# Patient Record
Sex: MALE | Race: WHITE | NOT HISPANIC OR LATINO | URBAN - METROPOLITAN AREA
[De-identification: names, ages, dates, MRNs, and addresses within clinical notes are randomized per-mention and may not be internally consistent; named-entity substitution may affect disease eponyms.]

---

## 2018-11-16 ENCOUNTER — OFFICE VISIT (OUTPATIENT)
Dept: URBAN - METROPOLITAN AREA CLINIC 16 | Facility: CLINIC | Age: 68
End: 2018-11-16
Payer: MEDICARE

## 2018-11-16 DIAGNOSIS — H25.811 COMBINED FORMS OF AGE-RELATED CATARACT, RIGHT EYE: Primary | ICD-10-CM

## 2018-11-16 PROCEDURE — 99214 OFFICE O/P EST MOD 30 MIN: CPT | Performed by: OPHTHALMOLOGY

## 2018-11-16 RX ORDER — DUREZOL 0.5 MG/ML
0.05 % EMULSION OPHTHALMIC
Qty: 5 | Refills: 1 | Status: INACTIVE
Start: 2018-11-16 | End: 2018-12-13

## 2018-11-16 RX ORDER — PREDNISOLONE ACETATE 10 MG/ML
1 % SUSPENSION/ DROPS OPHTHALMIC
Qty: 5 | Refills: 11 | Status: INACTIVE
Start: 2018-11-16 | End: 2019-11-19

## 2018-11-16 RX ORDER — BESIFLOXACIN 6 MG/ML
0.6 % SUSPENSION OPHTHALMIC
Qty: 5 | Refills: 1 | Status: INACTIVE
Start: 2018-11-16 | End: 2018-11-24

## 2018-11-16 ASSESSMENT — INTRAOCULAR PRESSURE
OD: 11
OS: 8

## 2018-11-16 ASSESSMENT — VISUAL ACUITY: OD: 20/60

## 2018-12-19 ENCOUNTER — TESTING ONLY (OUTPATIENT)
Dept: URBAN - METROPOLITAN AREA CLINIC 16 | Facility: CLINIC | Age: 68
End: 2018-12-19
Payer: COMMERCIAL

## 2018-12-19 PROCEDURE — 76519 ECHO EXAM OF EYE: CPT | Performed by: OPHTHALMOLOGY

## 2018-12-19 RX ORDER — OFLOXACIN 3 MG/ML
0.3 % SOLUTION/ DROPS OPHTHALMIC
Qty: 5 | Refills: 1 | Status: INACTIVE
Start: 2019-01-13 | End: 2019-03-04

## 2018-12-19 RX ORDER — PREDNISOLONE ACETATE 10 MG/ML
1 % SUSPENSION/ DROPS OPHTHALMIC
Qty: 5 | Refills: 1 | Status: INACTIVE
Start: 2019-01-15 | End: 2019-02-11

## 2018-12-19 ASSESSMENT — PACHYMETRY
OD: 29.09
OD: 3.52

## 2019-02-25 ENCOUNTER — SURGERY (OUTPATIENT)
Dept: URBAN - METROPOLITAN AREA SURGERY 11 | Facility: SURGERY | Age: 69
End: 2019-02-25
Payer: COMMERCIAL

## 2019-02-25 PROCEDURE — 66984 XCAPSL CTRC RMVL W/O ECP: CPT | Performed by: OPHTHALMOLOGY

## 2019-02-26 ENCOUNTER — POST-OPERATIVE VISIT (OUTPATIENT)
Dept: URBAN - METROPOLITAN AREA CLINIC 16 | Facility: CLINIC | Age: 69
End: 2019-02-26

## 2019-02-26 DIAGNOSIS — Z09 ENCNTR FOR F/U EXAM AFT TRTMT FOR COND OTH THAN MALIG NEOPLM: Primary | ICD-10-CM

## 2019-02-26 PROCEDURE — 99024 POSTOP FOLLOW-UP VISIT: CPT | Performed by: OPTOMETRIST

## 2019-02-26 ASSESSMENT — INTRAOCULAR PRESSURE
OS: 12
OD: 12

## 2019-03-04 ENCOUNTER — POST-OPERATIVE VISIT (OUTPATIENT)
Dept: URBAN - METROPOLITAN AREA CLINIC 16 | Facility: CLINIC | Age: 69
End: 2019-03-04

## 2019-03-04 PROCEDURE — 99024 POSTOP FOLLOW-UP VISIT: CPT | Performed by: OPTOMETRIST

## 2019-03-04 ASSESSMENT — INTRAOCULAR PRESSURE
OD: 14
OS: 8

## 2019-03-26 ENCOUNTER — POST-OPERATIVE VISIT (OUTPATIENT)
Dept: URBAN - METROPOLITAN AREA CLINIC 16 | Facility: CLINIC | Age: 69
End: 2019-03-26

## 2019-03-26 PROCEDURE — 99024 POSTOP FOLLOW-UP VISIT: CPT | Performed by: OPTOMETRIST

## 2019-03-26 ASSESSMENT — INTRAOCULAR PRESSURE
OS: 8
OD: 15

## 2019-03-26 ASSESSMENT — VISUAL ACUITY: OD: 20/30

## 2019-06-26 ENCOUNTER — OFFICE VISIT (OUTPATIENT)
Dept: URBAN - METROPOLITAN AREA CLINIC 16 | Facility: CLINIC | Age: 69
End: 2019-06-26
Payer: COMMERCIAL

## 2019-06-26 DIAGNOSIS — Z96.1 PRESENCE OF PSEUDOPHAKIA: Primary | ICD-10-CM

## 2019-06-26 DIAGNOSIS — H18.12 BULLOUS KERATOPATHY, LEFT EYE: ICD-10-CM

## 2019-06-26 PROCEDURE — 92014 COMPRE OPH EXAM EST PT 1/>: CPT | Performed by: OPTOMETRIST

## 2019-06-26 PROCEDURE — 92004 COMPRE OPH EXAM NEW PT 1/>: CPT | Performed by: OPTOMETRIST

## 2019-06-26 ASSESSMENT — INTRAOCULAR PRESSURE
OS: 7
OD: 14

## 2019-06-26 NOTE — IMPRESSION/PLAN
Impression: Presence of pseudophakia: Z96.1 OD. Plan: Discussed diagnosis. Will continue to observe.

## 2019-06-26 NOTE — IMPRESSION/PLAN
Impression: Bullous keratopathy, left eye: H18.12. Plan: Discussed diagnosis in detail with patient. Continue using current medication(s).

## 2020-10-12 ENCOUNTER — OFFICE VISIT (OUTPATIENT)
Dept: URBAN - METROPOLITAN AREA CLINIC 16 | Facility: CLINIC | Age: 70
End: 2020-10-12
Payer: COMMERCIAL

## 2020-10-12 PROCEDURE — 92014 COMPRE OPH EXAM EST PT 1/>: CPT | Performed by: OPTOMETRIST

## 2020-10-12 RX ORDER — PREDNISOLONE ACETATE 10 MG/ML
1 % SUSPENSION/ DROPS OPHTHALMIC
Qty: 5 | Refills: 11 | Status: INACTIVE
Start: 2020-10-12 | End: 2021-12-28

## 2020-10-12 ASSESSMENT — KERATOMETRY
OS: 50.13
OD: 41.25

## 2020-10-12 ASSESSMENT — INTRAOCULAR PRESSURE
OD: 15
OS: 7

## 2020-10-12 NOTE — IMPRESSION/PLAN
Impression: Other secondary cataract, right eye: H26.491. Plan: Discussed diagnosis in detail with patient.

## 2020-10-12 NOTE — IMPRESSION/PLAN
Impression: Bullous keratopathy, left eye: H18.12. Plan: Discussed diagnosis in detail with patient. Will continue to observe condition and or symptoms. Continue using current medication(s).

## 2020-10-23 ENCOUNTER — OFFICE VISIT (OUTPATIENT)
Dept: URBAN - METROPOLITAN AREA CLINIC 16 | Facility: CLINIC | Age: 70
End: 2020-10-23
Payer: COMMERCIAL

## 2020-10-23 DIAGNOSIS — H26.491 OTHER SECONDARY CATARACT, RIGHT EYE: Primary | ICD-10-CM

## 2020-10-23 PROCEDURE — 99214 OFFICE O/P EST MOD 30 MIN: CPT | Performed by: OPHTHALMOLOGY

## 2020-10-23 ASSESSMENT — INTRAOCULAR PRESSURE
OS: 11
OD: 11

## 2020-10-23 ASSESSMENT — VISUAL ACUITY
OD: 20/50
OS: HM

## 2020-10-23 ASSESSMENT — KERATOMETRY: OD: 41.25

## 2020-10-23 NOTE — IMPRESSION/PLAN
Impression: Bullous keratopathy, left eye: H18.12. failed PK OS with NV Plan: High risk for repeat PK

## 2020-11-23 ENCOUNTER — SURGERY (OUTPATIENT)
Dept: URBAN - METROPOLITAN AREA SURGERY 11 | Facility: SURGERY | Age: 70
End: 2020-11-23
Payer: COMMERCIAL

## 2020-11-23 PROCEDURE — 66821 AFTER CATARACT LASER SURGERY: CPT | Performed by: OPHTHALMOLOGY

## 2020-11-30 ENCOUNTER — POST-OPERATIVE VISIT (OUTPATIENT)
Dept: URBAN - METROPOLITAN AREA CLINIC 16 | Facility: CLINIC | Age: 70
End: 2020-11-30
Payer: COMMERCIAL

## 2020-11-30 DIAGNOSIS — Z48.810 ENCOUNTER FOR SURGICAL AFTERCARE FOLLOWING SURGERY ON A SENSE ORGAN: Primary | ICD-10-CM

## 2020-11-30 PROCEDURE — 99024 POSTOP FOLLOW-UP VISIT: CPT | Performed by: OPTOMETRIST

## 2020-11-30 ASSESSMENT — INTRAOCULAR PRESSURE
OD: 12
OS: 12

## 2020-11-30 NOTE — IMPRESSION/PLAN
Impression: S/P YAG PC OD - 7 Days. Encounter for surgical aftercare following surgery on a sense organ  Z48.810. Plan: --Advised patient to use artificial tears for comfort.

## 2021-12-27 ENCOUNTER — OFFICE VISIT (OUTPATIENT)
Dept: URBAN - METROPOLITAN AREA CLINIC 16 | Facility: CLINIC | Age: 71
End: 2021-12-27
Payer: COMMERCIAL

## 2021-12-27 DIAGNOSIS — H33.8 OTHER RETINAL DETACHMENTS: ICD-10-CM

## 2021-12-27 PROCEDURE — 99213 OFFICE O/P EST LOW 20 MIN: CPT | Performed by: OPTOMETRIST

## 2021-12-27 ASSESSMENT — INTRAOCULAR PRESSURE
OS: 12
OD: 12

## 2021-12-27 NOTE — IMPRESSION/PLAN
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 Will continue to observe.

## 2022-03-14 NOTE — IMPRESSION/PLAN
Impression: Bullous keratopathy, left eye: H18.12. with failed PK Plan: Discussed diagnosis in detail with patient. Continue using current medication(s). Medication refill given today.

## 2022-11-11 ENCOUNTER — OFFICE VISIT (OUTPATIENT)
Dept: URBAN - METROPOLITAN AREA CLINIC 24 | Facility: CLINIC | Age: 72
End: 2022-11-11
Payer: COMMERCIAL

## 2022-11-11 DIAGNOSIS — T86.8412 CORNEAL TRANSPLANT FAILURE, LEFT EYE: Primary | ICD-10-CM

## 2022-11-11 DIAGNOSIS — H33.8 OTHER RETINAL DETACHMENTS: ICD-10-CM

## 2022-11-11 DIAGNOSIS — Z96.1 PRESENCE OF PSEUDOPHAKIA: ICD-10-CM

## 2022-11-11 PROCEDURE — 76514 ECHO EXAM OF EYE THICKNESS: CPT | Performed by: OPHTHALMOLOGY

## 2022-11-11 PROCEDURE — 92025 CPTRIZED CORNEAL TOPOGRAPHY: CPT | Performed by: OPHTHALMOLOGY

## 2022-11-11 PROCEDURE — 99204 OFFICE O/P NEW MOD 45 MIN: CPT | Performed by: OPHTHALMOLOGY

## 2022-11-11 ASSESSMENT — INTRAOCULAR PRESSURE
OS: 8
OD: 14

## 2022-11-11 NOTE — IMPRESSION/PLAN
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 Will continue to observe. 
Retina clearance before PKP

## 2022-11-11 NOTE — IMPRESSION/PLAN
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 Discussed repeat PKP surgery. Visual rehabilitation can take a year or longer. Risks of Keratoplasty include similar risks to any intraocular surgery such as bleeding, cataract, and infection. Risks include rejection, need for additional surgery, need for glasses after, and the risks from the medications used. Steroids should not be stopped without instruction by a doctor. Information packet given. Questions answered. Explained to pt that he has extensive retinal scarring, visual potential is guarded and unknown. No guarantee that his vision would improve. Also risk of worsening vision and blindness. Pt understands and wishes to proceed with surgery. Schedule PKP OS Retina clearance before PKP

## 2023-04-24 ENCOUNTER — ADULT PHYSICAL (OUTPATIENT)
Dept: URBAN - METROPOLITAN AREA CLINIC 24 | Facility: CLINIC | Age: 73
End: 2023-04-24
Payer: COMMERCIAL

## 2023-04-24 DIAGNOSIS — Z01.818 ENCOUNTER FOR OTHER PREPROCEDURAL EXAMINATION: Primary | ICD-10-CM

## 2023-04-24 DIAGNOSIS — T86.8412 CORNEAL TRANSPLANT FAILURE, LEFT EYE: ICD-10-CM

## 2023-04-24 PROCEDURE — 99203 OFFICE O/P NEW LOW 30 MIN: CPT | Performed by: REGISTERED NURSE

## 2023-04-24 RX ORDER — PREDNISOLONE ACETATE 10 MG/ML
1 % SUSPENSION/ DROPS OPHTHALMIC
Qty: 5 | Refills: 3 | Status: ACTIVE
Start: 2023-04-24

## 2023-04-24 RX ORDER — OFLOXACIN 3 MG/ML
0.3 % SOLUTION/ DROPS OPHTHALMIC
Qty: 5 | Refills: 1 | Status: ACTIVE
Start: 2023-04-24

## 2023-04-25 ENCOUNTER — OFFICE VISIT (OUTPATIENT)
Dept: URBAN - METROPOLITAN AREA CLINIC 10 | Facility: CLINIC | Age: 73
End: 2023-04-25
Payer: COMMERCIAL

## 2023-04-25 PROCEDURE — 99214 OFFICE O/P EST MOD 30 MIN: CPT | Performed by: OPHTHALMOLOGY

## 2023-04-25 PROCEDURE — 92134 CPTRZ OPH DX IMG PST SGM RTA: CPT | Performed by: OPHTHALMOLOGY

## 2023-04-25 ASSESSMENT — INTRAOCULAR PRESSURE
OD: 17
OS: 9

## 2023-04-25 NOTE — IMPRESSION/PLAN
Impression: s/p PPVX for repair of Retinal RD OS '05 in [place] w SBP / VIT  H33.012. OS Plan: Retina is stable with Myopic fundus. Prior S. OIL / SBP / VIT multiple. F/u prior Dr. Jackie Sandhu and Dr. Kali Shields. Exam and imaging CONFIRM retina is ATTACHED but w EXTENSIVE RPE chng and scarring / SBP and atrophy c/w MULTIPLE surgy and RD repair--  However, w RETINA attached, OK proceed PKP / Cornea care.      RETINA yearly w [facility] thereafter

## 2023-04-25 NOTE — IMPRESSION/PLAN
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 See other plan. RETINA is attached (but limiting issue).   OK proceed K PKP

## 2023-04-28 ENCOUNTER — PRE-OPERATIVE VISIT (OUTPATIENT)
Dept: URBAN - METROPOLITAN AREA CLINIC 24 | Facility: CLINIC | Age: 73
End: 2023-04-28
Payer: COMMERCIAL

## 2023-04-28 DIAGNOSIS — H33.012 RETINAL DETACHMENT WITH SINGLE BREAK, LEFT EYE: ICD-10-CM

## 2023-04-28 DIAGNOSIS — Z96.1 PRESENCE OF PSEUDOPHAKIA: ICD-10-CM

## 2023-04-28 PROCEDURE — 92025 CPTRIZED CORNEAL TOPOGRAPHY: CPT | Performed by: OPHTHALMOLOGY

## 2023-04-28 PROCEDURE — 99214 OFFICE O/P EST MOD 30 MIN: CPT | Performed by: OPHTHALMOLOGY

## 2023-04-28 ASSESSMENT — KERATOMETRY
OS: 43.55
OD: 41.01

## 2023-04-28 ASSESSMENT — VISUAL ACUITY: OD: 20/30

## 2023-04-28 ASSESSMENT — INTRAOCULAR PRESSURE
OS: 8
OD: 12

## 2023-04-28 NOTE — IMPRESSION/PLAN
Impression: Corneal transplant failure, left eye: W18.5084. 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 Discussed repeat PKP surgery. Visual rehabilitation can take a year or longer. Risks of Keratoplasty include similar risks to any intraocular surgery such as bleeding, cataract, and infection. Risks include rejection, need for additional surgery, need for glasses after, and the risks from the medications used. Steroids should not be stopped without instruction by a doctor. Information packet given. Questions answered. Explained to pt that he has extensive retinal scarring, visual potential is guarded and unknown. No guarantee that his vision would improve. Also risk of worsening vision and blindness. Pt understands and wishes to proceed with PKP OS; cleared by retina.

## 2023-04-28 NOTE — IMPRESSION/PLAN
Impression: s/p PPVX for repair of Retinal RD OS '05 in [place] w SBP / VIT  H33.012. OS Plan: Per Dr. Abraham Harvey: Marli Joyner is stable with Myopic fundus. Prior S. OIL / SBP / VIT multiple. F/u prior Dr. Radha Milan and Dr. Alexander Mccallum. Exam and imaging CONFIRM retina is ATTACHED but w EXTENSIVE RPE chng and scarring / SBP and atrophy c/w MULTIPLE surgy and RD repair--  However, w RETINA attached, OK proceed PKP / Cornea care.      RETINA yearly w [facility] thereafter

## 2023-05-02 ENCOUNTER — SURGERY (OUTPATIENT)
Dept: URBAN - METROPOLITAN AREA SURGERY 5 | Facility: SURGERY | Age: 73
End: 2023-05-02
Payer: COMMERCIAL

## 2023-05-02 PROCEDURE — 65756 CORNEAL TRNSPL ENDOTHELIAL: CPT | Performed by: OPHTHALMOLOGY

## 2023-05-03 ENCOUNTER — POST-OPERATIVE VISIT (OUTPATIENT)
Dept: URBAN - METROPOLITAN AREA CLINIC 24 | Facility: CLINIC | Age: 73
End: 2023-05-03
Payer: COMMERCIAL

## 2023-05-03 DIAGNOSIS — Z48.810 ENCOUNTER FOR SURGICAL AFTERCARE FOLLOWING SURGERY ON A SENSE ORGAN: Primary | ICD-10-CM

## 2023-05-03 PROCEDURE — 99024 POSTOP FOLLOW-UP VISIT: CPT | Performed by: OPTOMETRIST

## 2023-05-03 NOTE — IMPRESSION/PLAN
Impression: S/P Penetrating keratoplasty OS - 1 Day. Encounter for surgical aftercare following surgery on a sense organ  Z48.810. Plan: PO instructions reviewed. Pt to call immediately if any changes in vision or new onset pain.

## 2023-05-11 ENCOUNTER — POST-OPERATIVE VISIT (OUTPATIENT)
Dept: URBAN - METROPOLITAN AREA CLINIC 26 | Facility: CLINIC | Age: 73
End: 2023-05-11
Payer: COMMERCIAL

## 2023-05-11 DIAGNOSIS — Z48.810 ENCOUNTER FOR SURGICAL AFTERCARE FOLLOWING SURGERY ON A SENSE ORGAN: Primary | ICD-10-CM

## 2023-05-11 PROCEDURE — 99024 POSTOP FOLLOW-UP VISIT: CPT | Performed by: OPTOMETRIST

## 2023-05-11 ASSESSMENT — INTRAOCULAR PRESSURE
OS: 12
OD: 18

## 2023-05-11 NOTE — IMPRESSION/PLAN
Impression:  Encounter for surgical aftercare following surgery on a sense organ  Z48.810. Plan: graft intact. pt will continue pred qid OS.  rtc as scheduled with Dr. Wally White or damian

## 2023-05-18 ENCOUNTER — OFFICE VISIT (OUTPATIENT)
Dept: URBAN - METROPOLITAN AREA CLINIC 24 | Facility: CLINIC | Age: 73
End: 2023-05-18
Payer: COMMERCIAL

## 2023-05-18 DIAGNOSIS — Z94.7 CORNEAL TRANSPLANT STATUS: Primary | ICD-10-CM

## 2023-05-18 DIAGNOSIS — H33.012 RETINAL DETACHMENT WITH SINGLE BREAK, LEFT EYE: ICD-10-CM

## 2023-05-18 DIAGNOSIS — H16.142 PUNCTATE KERATITIS, LEFT EYE: ICD-10-CM

## 2023-05-18 PROCEDURE — 68761 CLOSE TEAR DUCT OPENING: CPT | Performed by: OPHTHALMOLOGY

## 2023-05-18 PROCEDURE — 99024 POSTOP FOLLOW-UP VISIT: CPT | Performed by: OPHTHALMOLOGY

## 2023-05-18 RX ORDER — ERYTHROMYCIN 5 MG/G
OINTMENT OPHTHALMIC
Qty: 3.5 | Refills: 3 | Status: ACTIVE
Start: 2023-05-18

## 2023-05-18 ASSESSMENT — INTRAOCULAR PRESSURE
OD: 16
OS: 28

## 2023-05-18 NOTE — IMPRESSION/PLAN
Impression: Corneal transplant status: Z94.7.
- s/p PKP OS 5/2/23 Plan: POW#2, doing well. Edema resolving, sutures intact Cont pred and ATs QID, d/c oflox. Precautions reviewed. RTC IOP check.

## 2023-05-18 NOTE — IMPRESSION/PLAN
Impression: s/p PPVX for repair of Retinal RD OS '05 in [place] w SBP / VIT  H33.012. OS Plan: Per Dr. Anna Hensley: Chris Litter is stable with Myopic fundus. Prior S. OIL / SBP / VIT multiple. F/u prior Dr. Roxana Marley and Dr. Helio Foster.   Exam and imaging CONFIRM retina is ATTACHED but w EXTENSIVE RPE chng and scarring / SBP and atrophy c/w MULTIPLE surgy and RD repair--  However, w RETINA attached

## 2023-05-18 NOTE — IMPRESSION/PLAN
Impression: Punctate keratitis, left eye: H16.142. Plan: Increase PFATs to q2hr w/a. 
Start erythro joanna qhs 
PP placed LLL 0.4 ultraplug

## 2023-06-29 ENCOUNTER — OFFICE VISIT (OUTPATIENT)
Dept: URBAN - METROPOLITAN AREA CLINIC 24 | Facility: CLINIC | Age: 73
End: 2023-06-29
Payer: COMMERCIAL

## 2023-06-29 DIAGNOSIS — H16.142 PUNCTATE KERATITIS, LEFT EYE: ICD-10-CM

## 2023-06-29 DIAGNOSIS — Z94.7 CORNEAL TRANSPLANT STATUS: Primary | ICD-10-CM

## 2023-06-29 PROCEDURE — 99024 POSTOP FOLLOW-UP VISIT: CPT | Performed by: OPHTHALMOLOGY

## 2023-06-29 RX ORDER — PREDNISOLONE ACETATE 10 MG/ML
1 % SUSPENSION/ DROPS OPHTHALMIC
Qty: 5 | Refills: 11 | Status: ACTIVE
Start: 2023-06-29

## 2023-06-29 ASSESSMENT — INTRAOCULAR PRESSURE
OD: 12
OS: 4

## 2023-06-29 NOTE — IMPRESSION/PLAN
Impression: Punctate keratitis, left eye: H16.142. PP placed LLL 0.4 ultraplug Plan: Increase PFATs to q2hr w/a. 
Increase erythro to BID

## 2023-06-29 NOTE — IMPRESSION/PLAN
Impression: Corneal transplant status: Z94.7.
- s/p PKP OS 5/2/23 for PKP failure. Plan: POW#6, KNV extending into graft inferiorly Increase pred to 6x/d. Precautions reviewed. RTC IOP check.

## 2023-07-20 ENCOUNTER — OFFICE VISIT (OUTPATIENT)
Dept: URBAN - METROPOLITAN AREA CLINIC 10 | Facility: CLINIC | Age: 73
End: 2023-07-20
Payer: COMMERCIAL

## 2023-07-20 DIAGNOSIS — H16.142 PUNCTATE KERATITIS, LEFT EYE: ICD-10-CM

## 2023-07-20 DIAGNOSIS — Z94.7 CORNEAL TRANSPLANT STATUS: Primary | ICD-10-CM

## 2023-07-20 PROCEDURE — 99024 POSTOP FOLLOW-UP VISIT: CPT | Performed by: OPHTHALMOLOGY

## 2023-07-20 RX ORDER — DUREZOL 0.5 MG/ML
0.05 % EMULSION OPHTHALMIC
Qty: 5 | Refills: 6 | Status: ACTIVE
Start: 2023-07-20

## 2023-07-20 ASSESSMENT — INTRAOCULAR PRESSURE
OD: 10
OS: 7

## 2023-07-20 NOTE — IMPRESSION/PLAN
Impression: Corneal transplant status: Z94.7.
- s/p PKP OS 5/2/23 for PKP failure. Plan: POW#6, KNV extending into graft inferiorly' persistent START Durezol QID however, cont pred to 6x/d until Durezol is received. Precautions reviewed. RTC IOP check.

## 2023-08-10 ENCOUNTER — OFFICE VISIT (OUTPATIENT)
Dept: URBAN - METROPOLITAN AREA CLINIC 24 | Facility: CLINIC | Age: 73
End: 2023-08-10
Payer: COMMERCIAL

## 2023-08-10 DIAGNOSIS — Z94.7 CORNEAL TRANSPLANT STATUS: Primary | ICD-10-CM

## 2023-08-10 DIAGNOSIS — H16.142 PUNCTATE KERATITIS, LEFT EYE: ICD-10-CM

## 2023-08-10 DIAGNOSIS — T86.8412 CORNEAL TRANSPLANT FAILURE, LEFT EYE: ICD-10-CM

## 2023-08-10 PROCEDURE — 67515 INJECT/TREAT EYE SOCKET: CPT | Performed by: OPHTHALMOLOGY

## 2023-08-10 PROCEDURE — 99213 OFFICE O/P EST LOW 20 MIN: CPT | Performed by: OPHTHALMOLOGY

## 2023-08-10 RX ORDER — DOXYCYCLINE HYCLATE 100 MG/1
100 MG TABLET, COATED ORAL
Qty: 30 | Refills: 6 | Status: ACTIVE
Start: 2023-08-10

## 2023-08-10 ASSESSMENT — INTRAOCULAR PRESSURE
OS: 6
OD: 12

## 2023-08-24 ENCOUNTER — OFFICE VISIT (OUTPATIENT)
Dept: URBAN - METROPOLITAN AREA CLINIC 24 | Facility: CLINIC | Age: 73
End: 2023-08-24
Payer: COMMERCIAL

## 2023-08-24 DIAGNOSIS — T86.8412 CORNEAL TRANSPLANT FAILURE, LEFT EYE: ICD-10-CM

## 2023-08-24 DIAGNOSIS — Z94.7 CORNEAL TRANSPLANT STATUS: Primary | ICD-10-CM

## 2023-08-24 DIAGNOSIS — H16.142 PUNCTATE KERATITIS, LEFT EYE: ICD-10-CM

## 2023-08-24 PROCEDURE — 99213 OFFICE O/P EST LOW 20 MIN: CPT | Performed by: OPHTHALMOLOGY

## 2023-08-24 ASSESSMENT — INTRAOCULAR PRESSURE
OS: 9
OD: 15

## 2023-11-10 ENCOUNTER — OFFICE VISIT (OUTPATIENT)
Dept: URBAN - METROPOLITAN AREA CLINIC 24 | Facility: CLINIC | Age: 73
End: 2023-11-10
Payer: COMMERCIAL

## 2023-11-10 DIAGNOSIS — H16.142 PUNCTATE KERATITIS, LEFT EYE: ICD-10-CM

## 2023-11-10 DIAGNOSIS — T86.8402 CORNEAL TRANSPLANT REJECTION, LEFT EYE: Primary | ICD-10-CM

## 2023-11-10 PROCEDURE — 99213 OFFICE O/P EST LOW 20 MIN: CPT | Performed by: OPHTHALMOLOGY

## 2023-11-10 RX ORDER — ERYTHROMYCIN 5 MG/G
OINTMENT OPHTHALMIC
Qty: 3.5 | Refills: 3 | Status: ACTIVE
Start: 2023-11-10

## 2023-11-10 RX ORDER — DIFLUPREDNATE 0.5 MG/ML
0.05 % EMULSION OPHTHALMIC
Qty: 5 | Refills: 6 | Status: ACTIVE
Start: 2023-11-10

## 2023-11-10 RX ORDER — VALACYCLOVIR 1 G/1
TABLET, FILM COATED ORAL
Qty: 30 | Refills: 6 | Status: ACTIVE
Start: 2023-11-10

## 2023-11-10 ASSESSMENT — INTRAOCULAR PRESSURE
OS: 14
OD: 18

## 2024-01-05 ENCOUNTER — OFFICE VISIT (OUTPATIENT)
Dept: URBAN - METROPOLITAN AREA CLINIC 24 | Facility: CLINIC | Age: 74
End: 2024-01-05
Payer: COMMERCIAL

## 2024-01-05 DIAGNOSIS — H16.142 PUNCTATE KERATITIS, LEFT EYE: ICD-10-CM

## 2024-01-05 DIAGNOSIS — T86.8402 CORNEAL TRANSPLANT REJECTION, LEFT EYE: Primary | ICD-10-CM

## 2024-01-05 PROCEDURE — 99213 OFFICE O/P EST LOW 20 MIN: CPT | Performed by: OPHTHALMOLOGY

## 2024-01-05 PROCEDURE — 92025 CPTRIZED CORNEAL TOPOGRAPHY: CPT | Performed by: OPHTHALMOLOGY

## 2024-01-05 ASSESSMENT — INTRAOCULAR PRESSURE
OD: 16
OS: 17

## 2024-02-15 ENCOUNTER — OFFICE VISIT (OUTPATIENT)
Dept: URBAN - METROPOLITAN AREA CLINIC 24 | Facility: CLINIC | Age: 74
End: 2024-02-15
Payer: COMMERCIAL

## 2024-02-15 PROCEDURE — 92025 CPTRIZED CORNEAL TOPOGRAPHY: CPT | Performed by: OPHTHALMOLOGY

## 2024-02-15 PROCEDURE — 99213 OFFICE O/P EST LOW 20 MIN: CPT | Performed by: OPHTHALMOLOGY

## 2024-02-15 ASSESSMENT — INTRAOCULAR PRESSURE
OS: 8
OD: 11

## 2024-02-16 RX ORDER — CYCLOSPORINE/CHONDROITIN SULFATE PF 1 MG/ML
EMULSION OPHTHALMIC
Qty: 10 | Refills: 3 | Status: ACTIVE
Start: 2024-02-16

## 2024-04-26 ENCOUNTER — OFFICE VISIT (OUTPATIENT)
Dept: URBAN - METROPOLITAN AREA CLINIC 24 | Facility: CLINIC | Age: 74
End: 2024-04-26
Payer: COMMERCIAL

## 2024-04-26 DIAGNOSIS — H16.142 PUNCTATE KERATITIS, LEFT EYE: ICD-10-CM

## 2024-04-26 DIAGNOSIS — T86.8402 CORNEAL TRANSPLANT REJECTION, LEFT EYE: Primary | ICD-10-CM

## 2024-04-26 PROCEDURE — 99213 OFFICE O/P EST LOW 20 MIN: CPT | Performed by: OPHTHALMOLOGY

## 2024-04-26 ASSESSMENT — INTRAOCULAR PRESSURE: OS: 9

## 2024-06-26 ENCOUNTER — OFFICE VISIT (OUTPATIENT)
Dept: URBAN - METROPOLITAN AREA CLINIC 26 | Facility: CLINIC | Age: 74
End: 2024-06-26
Payer: COMMERCIAL

## 2024-06-26 DIAGNOSIS — T86.8402 CORNEAL TRANSPLANT REJECTION, LEFT EYE: Primary | ICD-10-CM

## 2024-06-26 DIAGNOSIS — H16.142 PUNCTATE KERATITIS, LEFT EYE: ICD-10-CM

## 2024-06-26 PROCEDURE — 99213 OFFICE O/P EST LOW 20 MIN: CPT | Performed by: OPTOMETRIST

## 2024-08-08 ENCOUNTER — OFFICE VISIT (OUTPATIENT)
Dept: URBAN - METROPOLITAN AREA CLINIC 24 | Facility: CLINIC | Age: 74
End: 2024-08-08
Payer: COMMERCIAL

## 2024-08-08 DIAGNOSIS — H33.012 RETINAL DETACHMENT WITH SINGLE BREAK, LEFT EYE: ICD-10-CM

## 2024-08-08 DIAGNOSIS — T86.8402 CORNEAL TRANSPLANT REJECTION, LEFT EYE: Primary | ICD-10-CM

## 2024-08-08 DIAGNOSIS — H16.142 PUNCTATE KERATITIS, LEFT EYE: ICD-10-CM

## 2024-08-08 PROCEDURE — 92025 CPTRIZED CORNEAL TOPOGRAPHY: CPT | Performed by: OPHTHALMOLOGY

## 2024-08-08 PROCEDURE — 99213 OFFICE O/P EST LOW 20 MIN: CPT | Performed by: OPHTHALMOLOGY

## 2024-08-08 RX ORDER — CYCLOSPORINE 0 G/ML
0.09 % SOLUTION/ DROPS OPHTHALMIC; TOPICAL
Qty: 0 | Refills: 0 | Status: ACTIVE
Start: 2024-08-08

## 2024-08-08 ASSESSMENT — INTRAOCULAR PRESSURE
OS: 8
OD: 9

## 2024-10-25 ENCOUNTER — OFFICE VISIT (OUTPATIENT)
Dept: URBAN - METROPOLITAN AREA CLINIC 24 | Facility: CLINIC | Age: 74
End: 2024-10-25
Payer: COMMERCIAL

## 2024-10-25 DIAGNOSIS — T86.8412 CORNEAL TRANSPLANT FAILURE, LEFT EYE: ICD-10-CM

## 2024-10-25 DIAGNOSIS — H33.012 RETINAL DETACHMENT WITH SINGLE BREAK, LEFT EYE: Primary | ICD-10-CM

## 2024-10-25 PROCEDURE — 99214 OFFICE O/P EST MOD 30 MIN: CPT | Performed by: OPHTHALMOLOGY

## 2024-10-25 PROCEDURE — 92134 CPTRZ OPH DX IMG PST SGM RTA: CPT | Performed by: OPHTHALMOLOGY

## 2024-10-25 ASSESSMENT — INTRAOCULAR PRESSURE
OD: 9
OS: 11

## 2024-11-14 ENCOUNTER — OFFICE VISIT (OUTPATIENT)
Dept: URBAN - METROPOLITAN AREA CLINIC 24 | Facility: CLINIC | Age: 74
End: 2024-11-14
Payer: COMMERCIAL

## 2024-11-14 DIAGNOSIS — T86.8402 CORNEAL TRANSPLANT REJECTION, LEFT EYE: Primary | ICD-10-CM

## 2024-11-14 DIAGNOSIS — H33.012 RETINAL DETACHMENT WITH SINGLE BREAK, LEFT EYE: ICD-10-CM

## 2024-11-14 DIAGNOSIS — H16.142 PUNCTATE KERATITIS, LEFT EYE: ICD-10-CM

## 2024-11-14 PROCEDURE — 99213 OFFICE O/P EST LOW 20 MIN: CPT | Performed by: OPHTHALMOLOGY

## 2024-11-14 ASSESSMENT — INTRAOCULAR PRESSURE
OS: 16
OD: 18

## 2025-03-19 ENCOUNTER — OFFICE VISIT (OUTPATIENT)
Dept: URBAN - METROPOLITAN AREA CLINIC 26 | Facility: CLINIC | Age: 75
End: 2025-03-19
Payer: COMMERCIAL

## 2025-03-19 DIAGNOSIS — H52.4 PRESBYOPIA: ICD-10-CM

## 2025-03-19 DIAGNOSIS — H52.13 MYOPIA, BILATERAL: ICD-10-CM

## 2025-03-19 DIAGNOSIS — T86.8402 CORNEAL TRANSPLANT REJECTION, LEFT EYE: ICD-10-CM

## 2025-03-19 DIAGNOSIS — H43.811 VITREOUS DEGENERATION, RIGHT EYE: ICD-10-CM

## 2025-03-19 DIAGNOSIS — H33.012 RETINAL DETACHMENT WITH SINGLE BREAK, LEFT EYE: Primary | ICD-10-CM

## 2025-03-19 DIAGNOSIS — Z96.1 PRESENCE OF PSEUDOPHAKIA: ICD-10-CM

## 2025-03-19 PROCEDURE — 92134 CPTRZ OPH DX IMG PST SGM RTA: CPT | Performed by: OPTOMETRIST

## 2025-03-19 PROCEDURE — 92014 COMPRE OPH EXAM EST PT 1/>: CPT | Performed by: OPTOMETRIST

## 2025-03-19 ASSESSMENT — VISUAL ACUITY
OD: 20/40
OS: LP

## 2025-03-19 ASSESSMENT — INTRAOCULAR PRESSURE
OS: 9
OD: 14